# Patient Record
Sex: MALE | Race: OTHER | Employment: PART TIME | URBAN - METROPOLITAN AREA
[De-identification: names, ages, dates, MRNs, and addresses within clinical notes are randomized per-mention and may not be internally consistent; named-entity substitution may affect disease eponyms.]

---

## 2022-04-12 ENCOUNTER — TELEPHONE (OUTPATIENT)
Dept: ONCOLOGY | Age: 57
End: 2022-04-12

## 2022-04-12 ENCOUNTER — INITIAL CONSULT (OUTPATIENT)
Dept: ONCOLOGY | Age: 57
End: 2022-04-12
Payer: MEDICAID

## 2022-04-12 ENCOUNTER — HOSPITAL ENCOUNTER (OUTPATIENT)
Age: 57
Discharge: HOME | End: 2022-04-12
Payer: MEDICAID

## 2022-04-12 VITALS
HEART RATE: 91 BPM | WEIGHT: 173.1 LBS | BODY MASS INDEX: 24.23 KG/M2 | SYSTOLIC BLOOD PRESSURE: 116 MMHG | TEMPERATURE: 97.6 F | HEIGHT: 71 IN | DIASTOLIC BLOOD PRESSURE: 79 MMHG

## 2022-04-12 DIAGNOSIS — D70.9 NEUTROPENIA, UNSPECIFIED TYPE (HCC): Primary | ICD-10-CM

## 2022-04-12 DIAGNOSIS — D70.9 NEUTROPENIA, UNSPECIFIED TYPE (HCC): ICD-10-CM

## 2022-04-12 LAB
ABSOLUTE EOS #: 0.04 K/UL (ref 0–0.44)
ABSOLUTE IMMATURE GRANULOCYTE: <0.03 K/UL (ref 0–0.3)
ABSOLUTE LYMPH #: 0.81 K/UL (ref 1.1–3.7)
ABSOLUTE MONO #: 0.35 K/UL (ref 0.1–1.2)
ABSOLUTE RETIC #: 0.05 M/UL (ref 0.03–0.08)
BASOPHILS # BLD: 2 % (ref 0–2)
BASOPHILS ABSOLUTE: 0.05 K/UL (ref 0–0.2)
EOSINOPHILS RELATIVE PERCENT: 2 % (ref 1–4)
FERRITIN: 140 NG/ML (ref 30–400)
FOLATE: >20 NG/ML
HAV IGM SER IA-ACNC: NONREACTIVE
HCT VFR BLD CALC: 43.6 % (ref 40.7–50.3)
HEMOGLOBIN: 14.7 G/DL (ref 13–17)
HEPATITIS B CORE IGM ANTIBODY: NONREACTIVE
HEPATITIS B SURFACE ANTIGEN: NONREACTIVE
HEPATITIS C ANTIBODY: NONREACTIVE
HIV AG/AB: NONREACTIVE
IMMATURE GRANULOCYTES: 0 %
IMMATURE RETIC FRACT: 8 % (ref 2.7–18.3)
IRON SATURATION: 26 % (ref 20–55)
IRON: 79 UG/DL (ref 59–158)
LACTATE DEHYDROGENASE: 197 U/L (ref 135–225)
LYMPHOCYTES # BLD: 31 % (ref 24–43)
MCH RBC QN AUTO: 29.6 PG (ref 25.2–33.5)
MCHC RBC AUTO-ENTMCNC: 33.7 G/DL (ref 28.4–34.8)
MCV RBC AUTO: 87.7 FL (ref 82.6–102.9)
MONOCYTES # BLD: 14 % (ref 3–12)
NRBC AUTOMATED: 0 PER 100 WBC
PDW BLD-RTO: 12.2 % (ref 11.8–14.4)
PLATELET # BLD: 257 K/UL (ref 138–453)
PMV BLD AUTO: 10.2 FL (ref 8.1–13.5)
RBC # BLD: 4.97 M/UL (ref 4.21–5.77)
RETIC %: 1 % (ref 0.5–1.9)
RETIC HEMOGLOBIN: 34.8 PG (ref 28.2–35.7)
SEG NEUTROPHILS: 52 % (ref 36–65)
SEGMENTED NEUTROPHILS ABSOLUTE COUNT: 1.34 K/UL (ref 1.5–8.1)
TOTAL IRON BINDING CAPACITY: 305 UG/DL (ref 250–450)
UNSATURATED IRON BINDING CAPACITY: 226 UG/DL (ref 112–347)
VITAMIN B-12: 310 PG/ML (ref 232–1245)
WBC # BLD: 2.6 K/UL (ref 3.5–11.3)

## 2022-04-12 PROCEDURE — 86021 WBC ANTIBODY IDENTIFICATION: CPT

## 2022-04-12 PROCEDURE — 86664 EPSTEIN-BARR NUCLEAR ANTIGEN: CPT

## 2022-04-12 PROCEDURE — 87389 HIV-1 AG W/HIV-1&-2 AB AG IA: CPT

## 2022-04-12 PROCEDURE — 85045 AUTOMATED RETICULOCYTE COUNT: CPT

## 2022-04-12 PROCEDURE — G8420 CALC BMI NORM PARAMETERS: HCPCS | Performed by: INTERNAL MEDICINE

## 2022-04-12 PROCEDURE — 85025 COMPLETE CBC W/AUTO DIFF WBC: CPT

## 2022-04-12 PROCEDURE — 83615 LACTATE (LD) (LDH) ENZYME: CPT

## 2022-04-12 PROCEDURE — 88184 FLOWCYTOMETRY/ TC 1 MARKER: CPT

## 2022-04-12 PROCEDURE — 36415 COLL VENOUS BLD VENIPUNCTURE: CPT

## 2022-04-12 PROCEDURE — 88185 FLOWCYTOMETRY/TC ADD-ON: CPT

## 2022-04-12 PROCEDURE — 86663 EPSTEIN-BARR ANTIBODY: CPT

## 2022-04-12 PROCEDURE — 80074 ACUTE HEPATITIS PANEL: CPT

## 2022-04-12 PROCEDURE — 82728 ASSAY OF FERRITIN: CPT

## 2022-04-12 PROCEDURE — 82746 ASSAY OF FOLIC ACID SERUM: CPT

## 2022-04-12 PROCEDURE — 83540 ASSAY OF IRON: CPT

## 2022-04-12 PROCEDURE — 86038 ANTINUCLEAR ANTIBODIES: CPT

## 2022-04-12 PROCEDURE — 82607 VITAMIN B-12: CPT

## 2022-04-12 PROCEDURE — 83550 IRON BINDING TEST: CPT

## 2022-04-12 PROCEDURE — 86665 EPSTEIN-BARR CAPSID VCA: CPT

## 2022-04-12 PROCEDURE — G8427 DOCREV CUR MEDS BY ELIG CLIN: HCPCS | Performed by: INTERNAL MEDICINE

## 2022-04-12 PROCEDURE — 99244 OFF/OP CNSLTJ NEW/EST MOD 40: CPT | Performed by: INTERNAL MEDICINE

## 2022-04-12 PROCEDURE — 86225 DNA ANTIBODY NATIVE: CPT

## 2022-04-12 RX ORDER — VITAMIN B COMPLEX
1 CAPSULE ORAL DAILY
COMMUNITY

## 2022-04-12 RX ORDER — ELECTROLYTES/DEXTROSE
SOLUTION, ORAL ORAL DAILY
COMMUNITY

## 2022-04-12 RX ORDER — ATORVASTATIN CALCIUM 40 MG/1
TABLET, FILM COATED ORAL
COMMUNITY
Start: 2022-04-04

## 2022-04-12 NOTE — PROGRESS NOTES
Patient ID: Bruno Mae, 1965, [id number], 62 y.o. Referred by : SULLY Vallejo CNP  Reason for consultation:   Leukopenia  History of vitiligo  HISTORY OF PRESENT ILLNESS:    Oncologic History:  Bruno Mae is a 62 y.o. male with a history of hyperlipidemia, vitiligo was seen during initial consultation visit for mild leukopenia noticed on recent lab work. Patient had recent lab work in March which showed WBC of 2.9 with ANC 1.4 and normal hemoglobin and platelets. He does not have systemic B symptoms such as unintentional weight loss, drenching night sweats fever chills. He was referred to hematology for further evaluation of his leukopenia. His lab work review indicated that he had normal WBC 2008 however since 2009 his counts have been on the low side. He denies any high risk sexual behavior, no history of IV drug abuse and denies any history of tobacco smoking. He does drink alcohol socially. 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 He report that he has been diagnosed with vitiligo many years ago and has very small hypopigmented patch on his left lower extremity. But recently has noted mild alopecia and with lupus patch on his scalp. He is not following with any rheumatologist or needing any treatment for his vitiligo thus far. He has started taking ginkgo biloba about a month ago and also has been using saw palmetto for past 3 to 4 years.   He gives history of colon cancer in his grandmother and heart disease in his both sides of family    Past Medical History:   Diagnosis Date    HPV in male     Hyperlipidemia        Past Surgical History:   Procedure Laterality Date    COLONOSCOPY      x3    TONSILLECTOMY       Allergies   Allergen Reactions    Clindamycin        Current Outpatient Medications   Medication Sig Dispense Refill    atorvastatin (LIPITOR) 40 MG tablet take 1 tablet by mouth once daily      Multiple Vitamin (MULTIVITAMIN ADULT) TABS Take by mouth daily      b complex vitamins capsule Take 1 capsule by mouth daily      COLLAGEN-VITAMIN C PO Take by mouth       No current facility-administered medications for this visit. Social History     Socioeconomic History    Marital status: Single     Spouse name: Not on file    Number of children: Not on file    Years of education: Not on file    Highest education level: Not on file   Occupational History    Not on file   Tobacco Use    Smoking status: Never Smoker    Smokeless tobacco: Never Used   Substance and Sexual Activity    Alcohol use: Yes     Comment: social    Drug use: Never    Sexual activity: Not on file   Other Topics Concern    Not on file   Social History Narrative    Not on file     Social Determinants of Health     Financial Resource Strain:     Difficulty of Paying Living Expenses: Not on file   Food Insecurity:     Worried About Running Out of Food in the Last Year: Not on file    Pk of Food in the Last Year: Not on file   Transportation Needs:     Lack of Transportation (Medical): Not on file    Lack of Transportation (Non-Medical):  Not on file   Physical Activity:     Days of Exercise per Week: Not on file    Minutes of Exercise per Session: Not on file   Stress:     Feeling of Stress : Not on file   Social Connections:     Frequency of Communication with Friends and Family: Not on file    Frequency of Social Gatherings with Friends and Family: Not on file    Attends Episcopal Services: Not on file    Active Member of Clubs or Organizations: Not on file    Attends Club or Organization Meetings: Not on file    Marital Status: Not on file   Intimate Partner Violence:     Fear of Current or Ex-Partner: Not on file    Emotionally Abused: Not on file    Physically Abused: Not on file    Sexually Abused: Not on file   Housing Stability:     Unable to Pay for Housing in the Last Year: Not on file    Number of Places Lived in the Last Year: Not on file    Unstable Housing in the Last Year: Not on file       Family History   Problem Relation Age of Onset    Diabetes Father     Diabetes Sister     Diabetes Maternal Grandfather      REVIEW OF SYSTEM:   Constitutional: No fever or chills. No night sweats, no weight loss   Eyes: No eye discharge, double vision, or eye pain   HEENT: negative for sore mouth, sore throat, hoarseness and voice change   Respiratory: negative for cough , sputum, dyspnea, wheezing, hemoptysis, chest pain   Cardiovascular: negative for chest pain, dyspnea, palpitations, orthopnea, PND   Gastrointestinal: negative for nausea, vomiting, diarrhea, constipation, abdominal pain, Dysphagia, hematemesis and hematochezia   Genitourinary: negative for frequency, dysuria, nocturia, urinary incontinence, and hematuria   Integument: negative for rash, skin lesions, bruises.    Hematologic/Lymphatic: negative for easy bruising, bleeding, lymphadenopathy, petechiae and swelling/edema   Endocrine: negative for heat or cold intolerance, tremor, weight changes, change in bowel habits and hair loss   Musculoskeletal: negative for myalgias, arthralgias, pain, joint swelling,and bone pain   Neurological: negative for headaches, dizziness, seizures, weakness, numbness   OBJECTIVE:         Vitals:    04/12/22 0946   BP: 116/79   Pulse: 91   Temp: 97.6 °F (36.4 °C)     PHYSICAL EXAM:   General appearance - well appearing, no in pain or distress   Mental status - alert and cooperative   Eyes - pupils equal and reactive, extraocular eye movements intact   Ears - bilateral TM's and external ear canals normal   Mouth - mucous membranes moist, pharynx normal without lesions   Neck - supple, no significant adenopathy   Lymphatics - no palpable lymphadenopathy, no hepatosplenomegaly   Chest - clear to auscultation, no wheezes, rales or rhonchi, symmetric air entry   Heart - normal rate, regular rhythm, normal S1, S2, no murmurs, rubs, clicks or gallops   Abdomen - soft, nontender, nondistended, no masses or organomegaly   Neurological - alert, oriented, normal speech, no focal findings or movement disorder noted   Musculoskeletal - no joint tenderness, deformity or swelling   Extremities - peripheral pulses normal, no pedal edema, no clubbing or cyanosis   Skin - normal coloration and turgor, no rashes, no suspicious skin lesions noted ,    LABORATORY DATA:   No results found for: WBC, HGB, HCT, MCV, PLT, LABLYMP, MID, GRAN, LYMPHOPCT, MIDPERCENT, GRANULOCYTES, RBC, MCH, MCHC, RDW, NEUTOPHILPCT, MONOPCT, EOSPCT, BASOPCT, NEUTROABS, LYMPHSABS, MONOSABS, EOSABS, BASOSABS      Chemistry    No results found for: NA, K, CL, CO2, BUN, CREATININE, GLU No results found for: CALCIUM, ALKPHOS, AST, ALT, BILITOT     PATHOLOGY DATA:   Reviewed  IMAGING DATA:    ReviewedNo image results found. ASSESSMENT:    Clark Oropeza is a 62 y.o. male with history of vitiligo with mild leukopenia which appears to be chronic since 2009. I reviewed the lab work, reviewed his prior records, discussed possible diagnosis and treatment recommendations. I discussed multiple etiologies of leukemia including infection, inflammation, drug-induced, liver disease, autoimmune condition, or primary bone marrow pathology. I will order some lab work today to evaluate his leukopenia including HIV panel flow cytometry and peripheral blood smear and rule out nutritional deficiencies. We will check ultrasound of the liver and spleen. Given his history of vitiligo the cause of his leukopenia could be immune mediated however he denies any recurrent infection therefore could be monitored conservatively. I do not see any indication for bone marrow biopsy as he does not have systemic B symptoms at this point. Patient's questions were sought and answered to his satisfaction and he agreed to proceed with the plan.   PLAN:     Lab work today  Ultrasound of the liver and spleen  Return to clinic in 2 to 3 weeks to discuss further recommendations    Ishan Thompson MD  Hematologist/Medical Oncologist    On this date 4/12/22  I have spent 55 minutes reviewing previous notes, test results and face to face with the patient discussing the diagnosis and importance of compliance with the treatment plan. Greater than 50% of that time was spent face-to-face with the patient in counseling and coordinating her care. This note is created with the assistance of a speech recognition program.  While intending to generate a document that actually reflects the content of the visit, the document can still have some errors including those of syntax and sound a like substitutions which may escape proof reading. It such instances, actual meaning can be extrapolated by contextual diversion.       CC:    Thelma Overton, APRN - CNP

## 2022-04-12 NOTE — TELEPHONE ENCOUNTER
LAbs today (print orders)  US abd  RTC after labs 92-3 weeks)    Pt to have labs drawn after md visit    US scheduled 4/25/22 @ 9:30am    RV scheduled 5/3/22 @ 2:45pm    PT was given AVS and an appt schedule    Electronically signed by Filipe Madrid on 4/12/2022 at 10:39 AM

## 2022-04-13 LAB
ANTI DNA DOUBLE STRANDED: 1.5 IU/ML
ANTI-NUCLEAR ANTIBODY (ANA): NEGATIVE
ENA ANTIBODIES SCREEN: 0.2 U/ML
SURGICAL PATHOLOGY REPORT: NORMAL

## 2022-04-14 LAB
EBV EARLY ANTIGEN IGG: 54 U/ML
EBV INTERPRETATION: ABNORMAL
EBV NUCLEAR AG AB: 396 U/ML
EPSTEIN-BARR VCA IGG: 1678 U/ML
EPSTEIN-BARR VCA IGM: 32 U/ML
FLOW CYTOMETRY BL: NORMAL
NEUTROPHIL AB, FLOW: NEGATIVE

## 2022-04-25 ENCOUNTER — HOSPITAL ENCOUNTER (OUTPATIENT)
Dept: ULTRASOUND IMAGING | Age: 57
Discharge: HOME | End: 2022-04-27
Payer: MEDICAID

## 2022-04-25 DIAGNOSIS — D70.9 NEUTROPENIA, UNSPECIFIED TYPE (HCC): ICD-10-CM

## 2022-04-25 PROCEDURE — 76705 ECHO EXAM OF ABDOMEN: CPT

## 2022-05-03 ENCOUNTER — TELEPHONE (OUTPATIENT)
Dept: ONCOLOGY | Age: 57
End: 2022-05-03

## 2022-05-03 ENCOUNTER — OFFICE VISIT (OUTPATIENT)
Dept: ONCOLOGY | Age: 57
End: 2022-05-03
Payer: MEDICAID

## 2022-05-03 VITALS
BODY MASS INDEX: 24.14 KG/M2 | WEIGHT: 173.1 LBS | HEART RATE: 89 BPM | TEMPERATURE: 97.7 F | SYSTOLIC BLOOD PRESSURE: 106 MMHG | DIASTOLIC BLOOD PRESSURE: 69 MMHG

## 2022-05-03 DIAGNOSIS — D70.9 NEUTROPENIA, UNSPECIFIED TYPE (HCC): Primary | ICD-10-CM

## 2022-05-03 PROCEDURE — G8427 DOCREV CUR MEDS BY ELIG CLIN: HCPCS | Performed by: INTERNAL MEDICINE

## 2022-05-03 PROCEDURE — 3017F COLORECTAL CA SCREEN DOC REV: CPT | Performed by: INTERNAL MEDICINE

## 2022-05-03 PROCEDURE — 1036F TOBACCO NON-USER: CPT | Performed by: INTERNAL MEDICINE

## 2022-05-03 PROCEDURE — G8420 CALC BMI NORM PARAMETERS: HCPCS | Performed by: INTERNAL MEDICINE

## 2022-05-03 PROCEDURE — 99214 OFFICE O/P EST MOD 30 MIN: CPT | Performed by: INTERNAL MEDICINE

## 2022-05-03 NOTE — TELEPHONE ENCOUNTER
RTC in 6 months with cbc    RV scheduled 11/3/22 @ 10:45am    PT was given AVS and an appt schedule    Electronically signed by Lidia Heaton on 5/3/2022 at 3:03 PM

## 2022-05-03 NOTE — PROGRESS NOTES
Patient ID: Familia Khan, 1965, [id number], 62 y.o. Referred by : SULLY Nogueira CNP  Reason for consultation:   Leukopenia  History of vitiligo  HISTORY OF PRESENT ILLNESS:    Oncologic History:  Familia Khan is a 62 y.o. male with a history of hyperlipidemia, vitiligo was seen during initial consultation visit for mild leukopenia noticed on recent lab work. Patient had recent lab work in March which showed WBC of 2.9 with ANC 1.4 and normal hemoglobin and platelets. He does not have systemic B symptoms such as unintentional weight loss, drenching night sweats fever chills. He was referred to hematology for further evaluation of his leukopenia. His lab work review indicated that he had normal WBC 2008 however since 2009 his counts have been on the low side. He denies any high risk sexual behavior, no history of IV drug abuse and denies any history of tobacco smoking. He does drink alcohol socially. 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 He report that he has been diagnosed with vitiligo many years ago and has very small hypopigmented patch on his left lower extremity. But recently has noted mild alopecia and with lupus patch on his scalp. He is not following with any rheumatologist or needing any treatment for his vitiligo thus far. He has started taking ginkgo biloba about a month ago and also has been using saw palmetto for past 3 to 4 years. He gives history of colon cancer in his grandmother and heart disease in his both sides of family    Interval history:  Patient is returning for follow-up visit and to discuss lab results, imaging studies and further recommendations. His ultrasound abdomen showed fatty liver but no evidence of hepatosplenomegaly. His flow cytometry has been negative and his peripheral blood smear did not show morphologic abnormalities. He denies any initial weight loss, drenching night sweats fever chills.     During this visit patient's allergy, social, medical, surgical history and medications were reviewed and updated. Past Medical History:   Diagnosis Date    HPV in male     Hyperlipidemia        Past Surgical History:   Procedure Laterality Date    COLONOSCOPY      x3    TONSILLECTOMY       Allergies   Allergen Reactions    Clindamycin        Current Outpatient Medications   Medication Sig Dispense Refill    atorvastatin (LIPITOR) 40 MG tablet take 1 tablet by mouth once daily      Multiple Vitamin (MULTIVITAMIN ADULT) TABS Take by mouth daily      b complex vitamins capsule Take 1 capsule by mouth daily      COLLAGEN-VITAMIN C PO Take by mouth       No current facility-administered medications for this visit. Social History     Socioeconomic History    Marital status: Single     Spouse name: Not on file    Number of children: Not on file    Years of education: Not on file    Highest education level: Not on file   Occupational History    Not on file   Tobacco Use    Smoking status: Never Smoker    Smokeless tobacco: Never Used   Substance and Sexual Activity    Alcohol use: Yes     Comment: social    Drug use: Never    Sexual activity: Not on file   Other Topics Concern    Not on file   Social History Narrative    Not on file     Social Determinants of Health     Financial Resource Strain:     Difficulty of Paying Living Expenses: Not on file   Food Insecurity:     Worried About Running Out of Food in the Last Year: Not on file    Pk of Food in the Last Year: Not on file   Transportation Needs:     Lack of Transportation (Medical): Not on file    Lack of Transportation (Non-Medical):  Not on file   Physical Activity:     Days of Exercise per Week: Not on file    Minutes of Exercise per Session: Not on file   Stress:     Feeling of Stress : Not on file   Social Connections:     Frequency of Communication with Friends and Family: Not on file    Frequency of Social Gatherings with Friends and Family: Not on file    Attends Synagogue Services: Not on file    Active Member of Clubs or Organizations: Not on file    Attends Club or Organization Meetings: Not on file    Marital Status: Not on file   Intimate Partner Violence:     Fear of Current or Ex-Partner: Not on file    Emotionally Abused: Not on file    Physically Abused: Not on file    Sexually Abused: Not on file   Housing Stability:     Unable to Pay for Housing in the Last Year: Not on file    Number of [place] in the Last Year: Not on file    Unstable Housing in the Last Year: Not on file       Family History   Problem Relation Age of Onset    Diabetes Father     Diabetes Sister     Diabetes Maternal Grandfather      REVIEW OF SYSTEM:   Constitutional: No fever or chills. No night sweats, no weight loss   Eyes: No eye discharge, double vision, or eye pain   HEENT: negative for sore mouth, sore throat, hoarseness and voice change   Respiratory: negative for cough , sputum, dyspnea, wheezing, hemoptysis, chest pain   Cardiovascular: negative for chest pain, dyspnea, palpitations, orthopnea, PND   Gastrointestinal: negative for nausea, vomiting, diarrhea, constipation, abdominal pain, Dysphagia, hematemesis and hematochezia   Genitourinary: negative for frequency, dysuria, nocturia, urinary incontinence, and hematuria   Integument: negative for rash, skin lesions, bruises.    Hematologic/Lymphatic: negative for easy bruising, bleeding, lymphadenopathy, petechiae and swelling/edema   Endocrine: negative for heat or cold intolerance, tremor, weight changes, change in bowel habits and hair loss   Musculoskeletal: negative for myalgias, arthralgias, pain, joint swelling,and bone pain   Neurological: negative for headaches, dizziness, seizures, weakness, numbness   OBJECTIVE:         Vitals:    05/03/22 1429   BP: 106/69   Pulse: 89   Temp: 97.7 °F (36.5 °C)     PHYSICAL EXAM:   General appearance - well appearing, no in pain or distress   Mental status - alert and cooperative   Eyes - pupils equal and reactive, extraocular eye movements intact   Ears - bilateral TM's and external ear canals normal   Mouth - mucous membranes moist, pharynx normal without lesions   Neck - supple, no significant adenopathy   Lymphatics - no palpable lymphadenopathy, no hepatosplenomegaly   Chest - clear to auscultation, no wheezes, rales or rhonchi, symmetric air entry   Heart - normal rate, regular rhythm, normal S1, S2, no murmurs, rubs, clicks or gallops   Abdomen - soft, nontender, nondistended, no masses or organomegaly   Neurological - alert, oriented, normal speech, no focal findings or movement disorder noted   Musculoskeletal - no joint tenderness, deformity or swelling   Extremities - peripheral pulses normal, no pedal edema, no clubbing or cyanosis   Skin - normal coloration and turgor, no rashes, no suspicious skin lesions noted ,    LABORATORY DATA:     Lab Results   Component Value Date    WBC 2.6 (L) 04/12/2022    HGB 14.7 04/12/2022    HCT 43.6 04/12/2022    MCV 87.7 04/12/2022     04/12/2022    LYMPHOPCT 31 04/12/2022    RBC 4.97 04/12/2022    MCH 29.6 04/12/2022    MCHC 33.7 04/12/2022    RDW 12.2 04/12/2022    MONOPCT 14 (H) 04/12/2022    BASOPCT 2 04/12/2022    NEUTROABS 1.34 (L) 04/12/2022    LYMPHSABS 0.81 (L) 04/12/2022    MONOSABS 0.35 04/12/2022    EOSABS 0.04 04/12/2022    BASOSABS 0.05 04/12/2022         Chemistry    No results found for: NA, K, CL, CO2, BUN, CREATININE, GLU No results found for: CALCIUM, ALKPHOS, AST, ALT, BILITOT     PATHOLOGY DATA:   Reviewed  Procedures/Addenda   FLOW CYTOMETRY REPORT     Date Ordered:     4/13/2022     Status:   Signed Out        Date Complete:     4/13/2022     By: Kiera Esteves M.D.        Date Reported:     4/13/2022       INTERPRETATION     Peripheral blood:     -  Mild neutropenia (1.34 K/microliter). -  Lymphopenia.      - Flow cytometric immunophenotyping analysis of peripheral blood   lymphocyte population is negative for B-cell monoclonality and T-cell   aberrancy. IMAGING DATA:    ReviewedUS ABDOMEN LIMITED Specify organ? LIVER, SPLEEN  Narrative: EXAMINATION:  RIGHT UPPER QUADRANT ULTRASOUND    4/25/2022 9:39 am    COMPARISON:  None. HISTORY:  ORDERING SYSTEM PROVIDED HISTORY: Neutropenia, unspecified type [facility])  TECHNOLOGIST PROVIDED HISTORY:  This procedure can be scheduled via [employer]. Access your [employer] account by  visiting [place].  evaliate liver spleen  Specify organ?->LIVER  Specify organ?->SPLEEN    FINDINGS:  LIVER:  The liver shows mild increased echogenicity suggesting mild hepatic  steatosis although other causes of diffuse hepatocellular disease should be  considered. By ultrasound no focal liver masses are seen. There is no  intrahepatic biliary dilatation. Flow in the main portal vein is hepatopetal.    BILIARY SYSTEM:  Gallbladder is unremarkable without evidence of  pericholecystic fluid, wall thickening or stones. Negative sonographic  Bernal's sign. Common bile duct is within normal limits measuring 3.4 mm. Jennifer Dye RIGHT KIDNEY: The right kidney is grossly unremarkable without evidence of  hydronephrosis. PANCREAS:  Visualized portions of the pancreas are unremarkable. Pancreas is  partially obscured by overlying bowel gas. OTHER: No evidence of right upper quadrant ascites. SPLEEN: Spleen has a fairly homogeneous echotexture and is nonenlarged  measuring up to 10.1 cm length. No regional fluid collections. Impression: Mild hepatic steatosis    The spleen is nonenlarged     ASSESSMENT:    Destini Garcia is a 62 y.o. male with history of vitiligo with mild leukopenia which appears to be chronic since 2009. I reviewed the lab work, reviewed his prior records, discussed possible diagnosis and treatment recommendations.   I discussed multiple etiologies of leukemia including infection, inflammation, drug-induced, liver disease, autoimmune condition, or primary bone marrow pathology. His lab work showed no evidence of infection, nutritional deficiency. His leukopenia appears chronic for many years and he does not have anemia or thrombocytopenia. I discussed possibly a bone marrow biopsy if his counts drop further. Given his history of vitiligo the cause of his leukopenia could be immune mediated however he denies any recurrent infection therefore could be monitored conservatively. I do not see any indication for bone marrow biopsy as he does not have systemic B symptoms at this point. Patient's questions were sought and answered to his satisfaction and he agreed to proceed with the plan. PLAN:   I reviewed his recent lab work, imaging studies, discussed diagnosis and treatment recommendations  No indication for bone marrow biopsy at this point  I will plan to repeat lab work in 6 months  Return to clinic in 6 months with CBC prior to    Fritz Campbell. Jose Gonzalez MD  Hematologist/Medical Oncologist    On this date 5/3/22  I have spent 40 minutes reviewing previous notes, test results and face to face with the patient discussing the diagnosis and importance of compliance with the treatment plan. Greater than 50% of that time was spent face-to-face with the patient in counseling and coordinating her care. This note is created with the assistance of a speech recognition program.  While intending to generate a document that actually reflects the content of the visit, the document can still have some errors including those of syntax and sound a like substitutions which may escape proof reading. It such instances, actual meaning can be extrapolated by contextual diversion.       CC:    Jesus Rene, APRN - CNP

## 2022-11-03 ENCOUNTER — OFFICE VISIT (OUTPATIENT)
Dept: ONCOLOGY | Age: 57
End: 2022-11-03
Payer: MEDICAID

## 2022-11-03 ENCOUNTER — TELEPHONE (OUTPATIENT)
Dept: ONCOLOGY | Age: 57
End: 2022-11-03

## 2022-11-03 ENCOUNTER — HOSPITAL ENCOUNTER (OUTPATIENT)
Age: 57
Discharge: HOME | End: 2022-11-03
Payer: MEDICAID

## 2022-11-03 VITALS
DIASTOLIC BLOOD PRESSURE: 71 MMHG | WEIGHT: 158.2 LBS | SYSTOLIC BLOOD PRESSURE: 103 MMHG | TEMPERATURE: 98.2 F | BODY MASS INDEX: 22.06 KG/M2 | HEART RATE: 90 BPM

## 2022-11-03 DIAGNOSIS — D70.9 NEUTROPENIA, UNSPECIFIED TYPE (HCC): Primary | ICD-10-CM

## 2022-11-03 DIAGNOSIS — D70.9 NEUTROPENIA, UNSPECIFIED TYPE (HCC): ICD-10-CM

## 2022-11-03 LAB
ABSOLUTE EOS #: 0 K/UL (ref 0–0.4)
ABSOLUTE LYMPH #: 1.1 K/UL (ref 1–4.8)
ABSOLUTE MONO #: 0.4 K/UL (ref 0.1–1.2)
BASOPHILS # BLD: 1 % (ref 0–2)
BASOPHILS ABSOLUTE: 0.1 K/UL (ref 0–0.2)
EOSINOPHILS RELATIVE PERCENT: 1 % (ref 1–4)
HCT VFR BLD CALC: 40.6 % (ref 41–53)
HEMOGLOBIN: 13.7 G/DL (ref 13.5–17.5)
LYMPHOCYTES # BLD: 24 % (ref 24–44)
MCH RBC QN AUTO: 29.5 PG (ref 26–34)
MCHC RBC AUTO-ENTMCNC: 33.6 G/DL (ref 31–37)
MCV RBC AUTO: 87.8 FL (ref 80–100)
MONOCYTES # BLD: 9 % (ref 2–11)
PDW BLD-RTO: 13.4 % (ref 12.5–15.4)
PLATELET # BLD: 226 K/UL (ref 140–450)
PMV BLD AUTO: 7.6 FL (ref 6–12)
RBC # BLD: 4.63 M/UL (ref 4.5–5.9)
SEG NEUTROPHILS: 65 % (ref 36–66)
SEGMENTED NEUTROPHILS ABSOLUTE COUNT: 2.9 K/UL (ref 1.8–7.7)
WBC # BLD: 4.4 K/UL (ref 3.5–11)

## 2022-11-03 PROCEDURE — 36415 COLL VENOUS BLD VENIPUNCTURE: CPT

## 2022-11-03 PROCEDURE — 3017F COLORECTAL CA SCREEN DOC REV: CPT | Performed by: INTERNAL MEDICINE

## 2022-11-03 PROCEDURE — G8484 FLU IMMUNIZE NO ADMIN: HCPCS | Performed by: INTERNAL MEDICINE

## 2022-11-03 PROCEDURE — G8420 CALC BMI NORM PARAMETERS: HCPCS | Performed by: INTERNAL MEDICINE

## 2022-11-03 PROCEDURE — 1036F TOBACCO NON-USER: CPT | Performed by: INTERNAL MEDICINE

## 2022-11-03 PROCEDURE — 85025 COMPLETE CBC W/AUTO DIFF WBC: CPT

## 2022-11-03 PROCEDURE — 99214 OFFICE O/P EST MOD 30 MIN: CPT | Performed by: INTERNAL MEDICINE

## 2022-11-03 PROCEDURE — G8427 DOCREV CUR MEDS BY ELIG CLIN: HCPCS | Performed by: INTERNAL MEDICINE

## 2022-11-03 RX ORDER — METFORMIN HYDROCHLORIDE 500 MG/1
TABLET, EXTENDED RELEASE ORAL
COMMUNITY
Start: 2022-10-14

## 2022-11-03 NOTE — TELEPHONE ENCOUNTER
AVS from 11/3/22    RTC in 6 months with labs (per md its actually 1 year )    Rv scheduled for 11/16/23 @ 10am with labs at that time ( cbc)    PT was given AVS and appt schedule

## 2022-11-03 NOTE — PROGRESS NOTES
Patient ID: Dre Wolfe, 1965, [id number], 62 y.o. Referred by : SULLY Garcia CNP  Reason for consultation:   Leukopenia  History of vitiligo  HISTORY OF PRESENT ILLNESS:    Oncologic History:  Dre Wolfe is a 62 y.o. male with a history of hyperlipidemia, vitiligo was seen during initial consultation visit for mild leukopenia noticed on recent lab work. Patient had recent lab work in March which showed WBC of 2.9 with ANC 1.4 and normal hemoglobin and platelets. He does not have systemic B symptoms such as unintentional weight loss, drenching night sweats fever chills. He was referred to hematology for further evaluation of his leukopenia. His lab work review indicated that he had normal WBC 2008 however since 2009 his counts have been on the low side. He denies any high risk sexual behavior, no history of IV drug abuse and denies any history of tobacco smoking. He does drink alcohol socially. 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 He report that he has been diagnosed with vitiligo many years ago and has very small hypopigmented patch on his left lower extremity. But recently has noted mild alopecia and with lupus patch on his scalp. He is not following with any rheumatologist or needing any treatment for his vitiligo thus far. He has started taking ginkgo biloba about a month ago and also has been using saw palmetto for past 3 to 4 years. He gives history of colon cancer in his grandmother and heart disease in his both sides of family    Interval history:  Patient is returning for follow-up visit and to discuss lab results and further recommendations. He denies any recurrent infection. He denies any unintentional weight loss, drenching night sweats fever chills. His recent lab work showed stable WBC and his platelets and hemoglobin are within normal limits.       During this visit patient's allergy, social, medical, surgical history and medications were reviewed and updated. Past Medical History:   Diagnosis Date    HPV in male     Hyperlipidemia        Past Surgical History:   Procedure Laterality Date    COLONOSCOPY      x3    TONSILLECTOMY       Allergies   Allergen Reactions    Clindamycin        Current Outpatient Medications   Medication Sig Dispense Refill    metFORMIN (GLUCOPHAGE-XR) 500 MG extended release tablet take 1 tablet by mouth twice a day      atorvastatin (LIPITOR) 40 MG tablet take 1 tablet by mouth once daily      Multiple Vitamin (MULTIVITAMIN ADULT) TABS Take by mouth daily      b complex vitamins capsule Take 1 capsule by mouth daily      COLLAGEN-VITAMIN C PO Take by mouth       No current facility-administered medications for this visit. Social History     Socioeconomic History    Marital status: Single     Spouse name: Not on file    Number of children: Not on file    Years of education: Not on file    Highest education level: Not on file   Occupational History    Not on file   Tobacco Use    Smoking status: Never    Smokeless tobacco: Never   Substance and Sexual Activity    Alcohol use: Yes     Comment: social    Drug use: Never    Sexual activity: Not on file   Other Topics Concern    Not on file   Social History Narrative    Not on file     Social Determinants of Health     Financial Resource Strain: Not on file   Food Insecurity: Not on file   Transportation Needs: Not on file   Physical Activity: Not on file   Stress: Not on file   Social Connections: Not on file   Intimate Partner Violence: Not on file   Housing Stability: Not on file       Family History   Problem Relation Age of Onset    Diabetes Father     Diabetes Sister     Diabetes Maternal Grandfather      REVIEW OF SYSTEM:   Constitutional: No fever or chills.  No night sweats, no weight loss   Eyes: No eye discharge, double vision, or eye pain   HEENT: negative for sore mouth, sore throat, hoarseness and voice change   Respiratory: negative for cough , sputum, dyspnea, wheezing, hemoptysis, chest pain   Cardiovascular: negative for chest pain, dyspnea, palpitations, orthopnea, PND   Gastrointestinal: negative for nausea, vomiting, diarrhea, constipation, abdominal pain, Dysphagia, hematemesis and hematochezia   Genitourinary: negative for frequency, dysuria, nocturia, urinary incontinence, and hematuria   Integument: negative for rash, skin lesions, bruises.    Hematologic/Lymphatic: negative for easy bruising, bleeding, lymphadenopathy, petechiae and swelling/edema   Endocrine: negative for heat or cold intolerance, tremor, weight changes, change in bowel habits and hair loss   Musculoskeletal: negative for myalgias, arthralgias, pain, joint swelling,and bone pain   Neurological: negative for headaches, dizziness, seizures, weakness, numbness   OBJECTIVE:         Vitals:    11/03/22 1104   BP: 103/71   Pulse: 90   Temp: 98.2 °F (36.8 °C)     PHYSICAL EXAM:   General appearance - well appearing, no in pain or distress   Mental status - alert and cooperative   Eyes - pupils equal and reactive, extraocular eye movements intact   Ears - bilateral TM's and external ear canals normal   Mouth - mucous membranes moist, pharynx normal without lesions   Neck - supple, no significant adenopathy   Lymphatics - no palpable lymphadenopathy, no hepatosplenomegaly   Chest - clear to auscultation, no wheezes, rales or rhonchi, symmetric air entry   Heart - normal rate, regular rhythm, normal S1, S2, no murmurs, rubs, clicks or gallops   Abdomen - soft, nontender, nondistended, no masses or organomegaly   Neurological - alert, oriented, normal speech, no focal findings or movement disorder noted   Musculoskeletal - no joint tenderness, deformity or swelling   Extremities - peripheral pulses normal, no pedal edema, no clubbing or cyanosis   Skin - normal coloration and turgor, no rashes, no suspicious skin lesions noted ,    LABORATORY DATA:     Lab Results   Component Value Date    WBC 4.4 11/03/2022    HGB 13.7 11/03/2022    HCT 40.6 (L) 11/03/2022    MCV 87.8 11/03/2022     11/03/2022    LYMPHOPCT 24 11/03/2022    RBC 4.63 11/03/2022    MCH 29.5 11/03/2022    MCHC 33.6 11/03/2022    RDW 13.4 11/03/2022    MONOPCT 9 11/03/2022    BASOPCT 1 11/03/2022    NEUTROABS 2.90 11/03/2022    LYMPHSABS 1.10 11/03/2022    MONOSABS 0.40 11/03/2022    EOSABS 0.00 11/03/2022    BASOSABS 0.10 11/03/2022         Chemistry    No results found for: NA, K, CL, CO2, BUN, CREATININE, GLU No results found for: CALCIUM, ALKPHOS, AST, ALT, BILITOT     PATHOLOGY DATA:   Reviewed  Procedures/Addenda   FLOW CYTOMETRY REPORT     Date Ordered:     4/13/2022     Status:   Signed Out        Date Complete:     4/13/2022     By:  Khushboo Trotter M.D. Date Reported:     4/13/2022       INTERPRETATION     Peripheral blood:     -  Mild neutropenia (1.34 K/microliter). -  Lymphopenia. - Flow cytometric immunophenotyping analysis of peripheral blood   lymphocyte population is negative for B-cell monoclonality and T-cell   aberrancy. IMAGING DATA:    ReviewedUS ABDOMEN LIMITED Specify organ? LIVER, SPLEEN  Narrative: EXAMINATION:  RIGHT UPPER QUADRANT ULTRASOUND    4/25/2022 9:39 am    COMPARISON:  None. HISTORY:  ORDERING SYSTEM PROVIDED HISTORY: Neutropenia, unspecified type [facility])  TECHNOLOGIST PROVIDED HISTORY:  This procedure can be scheduled via [employer]. Access your [employer] account by  visiting [facility].  evaliate liver spleen  Specify organ?->LIVER  Specify organ?->SPLEEN    FINDINGS:  LIVER:  The liver shows mild increased echogenicity suggesting mild hepatic  steatosis although other causes of diffuse hepatocellular disease should be  considered. By ultrasound no focal liver masses are seen. There is no  intrahepatic biliary dilatation.   Flow in the main portal vein is hepatopetal.    BILIARY SYSTEM:  Gallbladder is unremarkable without evidence of  pericholecystic fluid, wall thickening or stones. Negative sonographic  Bernal's sign. Common bile duct is within normal limits measuring 3.4 mm. Gracia Samuel RIGHT KIDNEY: The right kidney is grossly unremarkable without evidence of  hydronephrosis. PANCREAS:  Visualized portions of the pancreas are unremarkable. Pancreas is  partially obscured by overlying bowel gas. OTHER: No evidence of right upper quadrant ascites. SPLEEN: Spleen has a fairly homogeneous echotexture and is nonenlarged  measuring up to 10.1 cm length. No regional fluid collections. Impression: Mild hepatic steatosis    The spleen is nonenlarged     ASSESSMENT:    Kei Tomlinson is a 62 y.o. male with history of vitiligo with mild leukopenia which appears to be chronic since 2009. I reviewed the lab work, reviewed his prior records, discussed possible diagnosis and treatment recommendations. I discussed multiple etiologies of leukemia including infection, inflammation, drug-induced, liver disease, autoimmune condition, or primary bone marrow pathology. His lab work showed no evidence of infection, nutritional deficiency. His leukopenia appears chronic for many years and he does not have anemia or thrombocytopenia. I discussed possibly a bone marrow biopsy if his counts drop further. Given his history of vitiligo the cause of his leukopenia could be immune mediated however he denies any recurrent infection therefore could be monitored conservatively. I do not see any indication for bone marrow biopsy as he does not have systemic B symptoms at this point. Patient's questions were sought and answered to his satisfaction and he agreed to proceed with the plan.   PLAN:   I reviewed his recent lab work, discussed diagnosis, prognosis and treatment recommendations   Recent lab work showed improvement in his WBC   He does not have anemia or thrombocytopenia   No indication for bone marrow biopsy at this point   Return to clinic in 6 months with labs prior Ishan Bautista MD  Hematologist/Medical Oncologist    On this date 11/3/22  I have spent 40 minutes reviewing previous notes, test results and face to face with the patient discussing the diagnosis and importance of compliance with the treatment plan. Greater than 50% of that time was spent face-to-face with the patient in counseling and coordinating her care. This note is created with the assistance of a speech recognition program.  While intending to generate a document that actually reflects the content of the visit, the document can still have some errors including those of syntax and sound a like substitutions which may escape proof reading. It such instances, actual meaning can be extrapolated by contextual diversion.       CC:    SULLY Lamar - CNP

## 2023-11-16 DIAGNOSIS — D70.9 NEUTROPENIA, UNSPECIFIED TYPE (HCC): Primary | ICD-10-CM

## 2023-11-28 ENCOUNTER — OFFICE VISIT (OUTPATIENT)
Dept: ONCOLOGY | Age: 58
End: 2023-11-28

## 2023-11-28 ENCOUNTER — HOSPITAL ENCOUNTER (OUTPATIENT)
Age: 58
Discharge: HOME | End: 2023-11-28

## 2023-11-28 ENCOUNTER — TELEPHONE (OUTPATIENT)
Dept: ONCOLOGY | Age: 58
End: 2023-11-28

## 2023-11-28 VITALS
SYSTOLIC BLOOD PRESSURE: 99 MMHG | RESPIRATION RATE: 14 BRPM | BODY MASS INDEX: 21.74 KG/M2 | HEART RATE: 75 BPM | TEMPERATURE: 97.2 F | WEIGHT: 155.9 LBS | DIASTOLIC BLOOD PRESSURE: 63 MMHG

## 2023-11-28 DIAGNOSIS — D70.9 NEUTROPENIA, UNSPECIFIED TYPE (HCC): Primary | ICD-10-CM

## 2023-11-28 DIAGNOSIS — D70.9 NEUTROPENIA, UNSPECIFIED TYPE (HCC): ICD-10-CM

## 2023-11-28 LAB
BASOPHILS # BLD: 0 K/UL (ref 0–0.2)
BASOPHILS NFR BLD: 1 % (ref 0–2)
EOSINOPHIL # BLD: 0.1 K/UL (ref 0–0.4)
EOSINOPHILS RELATIVE PERCENT: 2 % (ref 1–4)
ERYTHROCYTE [DISTWIDTH] IN BLOOD BY AUTOMATED COUNT: 13 % (ref 12.5–15.4)
HCT VFR BLD AUTO: 40.2 % (ref 41–53)
HGB BLD-MCNC: 13.8 G/DL (ref 13.5–17.5)
LYMPHOCYTES NFR BLD: 1 K/UL (ref 1–4.8)
LYMPHOCYTES RELATIVE PERCENT: 38 % (ref 24–44)
MCH RBC QN AUTO: 30.2 PG (ref 26–34)
MCHC RBC AUTO-ENTMCNC: 34.3 G/DL (ref 31–37)
MCV RBC AUTO: 88.2 FL (ref 80–100)
MONOCYTES NFR BLD: 0.3 K/UL (ref 0.1–1.2)
MONOCYTES NFR BLD: 10 % (ref 2–11)
NEUTROPHILS NFR BLD: 49 % (ref 36–66)
NEUTS SEG NFR BLD: 1.3 K/UL (ref 1.8–7.7)
PLATELET # BLD AUTO: 204 K/UL (ref 140–450)
PMV BLD AUTO: 7.6 FL (ref 6–12)
RBC # BLD AUTO: 4.55 M/UL (ref 4.5–5.9)
WBC OTHER # BLD: 2.6 K/UL (ref 3.5–11)

## 2023-11-28 PROCEDURE — 99214 OFFICE O/P EST MOD 30 MIN: CPT | Performed by: INTERNAL MEDICINE

## 2023-11-28 PROCEDURE — 36415 COLL VENOUS BLD VENIPUNCTURE: CPT

## 2023-11-28 PROCEDURE — 1036F TOBACCO NON-USER: CPT | Performed by: INTERNAL MEDICINE

## 2023-11-28 PROCEDURE — 99211 OFF/OP EST MAY X REQ PHY/QHP: CPT | Performed by: INTERNAL MEDICINE

## 2023-11-28 PROCEDURE — G8427 DOCREV CUR MEDS BY ELIG CLIN: HCPCS | Performed by: INTERNAL MEDICINE

## 2023-11-28 PROCEDURE — 3017F COLORECTAL CA SCREEN DOC REV: CPT | Performed by: INTERNAL MEDICINE

## 2023-11-28 PROCEDURE — G8420 CALC BMI NORM PARAMETERS: HCPCS | Performed by: INTERNAL MEDICINE

## 2023-11-28 PROCEDURE — G8484 FLU IMMUNIZE NO ADMIN: HCPCS | Performed by: INTERNAL MEDICINE

## 2023-11-28 PROCEDURE — 85025 COMPLETE CBC W/AUTO DIFF WBC: CPT

## 2023-11-28 NOTE — PROGRESS NOTES
unremarkable without evidence of  pericholecystic fluid, wall thickening or stones. Negative sonographic  Bernal's sign. Common bile duct is within normal limits measuring 3.4 mm. Johnsie Garvin RIGHT KIDNEY: The right kidney is grossly unremarkable without evidence of  hydronephrosis. PANCREAS:  Visualized portions of the pancreas are unremarkable. Pancreas is  partially obscured by overlying bowel gas. OTHER: No evidence of right upper quadrant ascites. SPLEEN: Spleen has a fairly homogeneous echotexture and is nonenlarged  measuring up to 10.1 cm length. No regional fluid collections. Impression: Mild hepatic steatosis    The spleen is nonenlarged       ASSESSMENT:    Esther Sandhu is a 62 y.o. male with history of vitiligo with mild leukopenia which appears to be chronic since 2009. I reviewed the lab work, reviewed his prior records, discussed possible diagnosis and treatment recommendations. I discussed multiple etiologies of leukemia including infection, inflammation, drug-induced, liver disease, autoimmune condition, or primary bone marrow pathology. His lab work showed no evidence of infection, nutritional deficiency. His leukopenia appears chronic for many years and he does not have anemia or thrombocytopenia. I discussed possibly a bone marrow biopsy if his counts drop further. Given his history of vitiligo the cause of his leukopenia could be immune mediated however he denies any recurrent infection therefore could be monitored conservatively. I do not see any indication for bone marrow biopsy as he does not have systemic B symptoms at this point. Patient's questions were sought and answered to his satisfaction and he agreed to proceed with the plan.   PLAN:   I reviewed his recent lab work, discussed diagnosis and treatment recommendations   His WBC is still low but stable at 2.6 K  He does not have systemic B symptoms   His hemoglobin and platelets are within normal limits,

## 2024-11-26 ENCOUNTER — OFFICE VISIT (OUTPATIENT)
Dept: ONCOLOGY | Age: 59
End: 2024-11-26
Payer: COMMERCIAL

## 2024-11-26 ENCOUNTER — TELEPHONE (OUTPATIENT)
Dept: ONCOLOGY | Age: 59
End: 2024-11-26

## 2024-11-26 ENCOUNTER — HOSPITAL ENCOUNTER (OUTPATIENT)
Age: 59
Discharge: HOME | End: 2024-11-26
Payer: COMMERCIAL

## 2024-11-26 VITALS
TEMPERATURE: 96.9 F | HEART RATE: 91 BPM | SYSTOLIC BLOOD PRESSURE: 111 MMHG | WEIGHT: 151 LBS | BODY MASS INDEX: 21.06 KG/M2 | DIASTOLIC BLOOD PRESSURE: 69 MMHG

## 2024-11-26 DIAGNOSIS — D70.9 NEUTROPENIA, UNSPECIFIED TYPE (HCC): ICD-10-CM

## 2024-11-26 DIAGNOSIS — D70.9 NEUTROPENIA, UNSPECIFIED TYPE (HCC): Primary | ICD-10-CM

## 2024-11-26 LAB
BASOPHILS # BLD: 0 K/UL (ref 0–0.2)
BASOPHILS NFR BLD: 1 % (ref 0–2)
EOSINOPHIL # BLD: 0.1 K/UL (ref 0–0.4)
EOSINOPHILS RELATIVE PERCENT: 2 % (ref 1–4)
ERYTHROCYTE [DISTWIDTH] IN BLOOD BY AUTOMATED COUNT: 13 % (ref 12.5–15.4)
HCT VFR BLD AUTO: 43.3 % (ref 41–53)
HGB BLD-MCNC: 14.5 G/DL (ref 13.5–17.5)
LYMPHOCYTES NFR BLD: 1 K/UL (ref 1–4.8)
LYMPHOCYTES RELATIVE PERCENT: 30 % (ref 24–44)
MCH RBC QN AUTO: 29.8 PG (ref 26–34)
MCHC RBC AUTO-ENTMCNC: 33.6 G/DL (ref 31–37)
MCV RBC AUTO: 88.8 FL (ref 80–100)
MONOCYTES NFR BLD: 0.3 K/UL (ref 0.1–1.2)
MONOCYTES NFR BLD: 9 % (ref 2–11)
NEUTROPHILS NFR BLD: 58 % (ref 36–66)
NEUTS SEG NFR BLD: 1.9 K/UL (ref 1.8–7.7)
PLATELET # BLD AUTO: 226 K/UL (ref 140–450)
PMV BLD AUTO: 8.1 FL (ref 6–12)
RBC # BLD AUTO: 4.88 M/UL (ref 4.5–5.9)
WBC OTHER # BLD: 3.3 K/UL (ref 3.5–11)

## 2024-11-26 PROCEDURE — 85025 COMPLETE CBC W/AUTO DIFF WBC: CPT

## 2024-11-26 PROCEDURE — G8484 FLU IMMUNIZE NO ADMIN: HCPCS | Performed by: INTERNAL MEDICINE

## 2024-11-26 PROCEDURE — G8427 DOCREV CUR MEDS BY ELIG CLIN: HCPCS | Performed by: INTERNAL MEDICINE

## 2024-11-26 PROCEDURE — 3017F COLORECTAL CA SCREEN DOC REV: CPT | Performed by: INTERNAL MEDICINE

## 2024-11-26 PROCEDURE — 99214 OFFICE O/P EST MOD 30 MIN: CPT | Performed by: INTERNAL MEDICINE

## 2024-11-26 PROCEDURE — 36415 COLL VENOUS BLD VENIPUNCTURE: CPT

## 2024-11-26 PROCEDURE — 1036F TOBACCO NON-USER: CPT | Performed by: INTERNAL MEDICINE

## 2024-11-26 PROCEDURE — G8420 CALC BMI NORM PARAMETERS: HCPCS | Performed by: INTERNAL MEDICINE

## 2024-11-26 NOTE — TELEPHONE ENCOUNTER
Instructions   from Dr. Ishan Sarabia MD    RTC in one year w  cbc prior    Rv scheduled on 11/25/2025 @10am; gave pt lab orders and their AVS

## 2024-11-26 NOTE — PROGRESS NOTES
3.3   His hemoglobin and platelets are within normal limits   He does not have any systemic B symptoms   No indication for bone marrow biopsy   Return to clinic in 1 year with CBC prior or earlier if needed       Ishan Sarabia MD  Hematologist/Medical Oncologist    On this date 11/26/24  I have spent 40 minutes reviewing previous notes, test results and face to face with the patient discussing the diagnosis and importance of compliance with the treatment plan. Greater than 50% of that time was spent face-to-face with the patient in counseling and coordinating her care.      This note is created with the assistance of a speech recognition program.  While intending to generate a document that actually reflects the content of the visit, the document can still have some errors including those of syntax and sound a like substitutions which may escape proof reading.  It such instances, actual meaning can be extrapolated by contextual diversion.    CC:    Magda Bonilla, APRN - CNP